# Patient Record
Sex: FEMALE | Race: WHITE | Employment: UNEMPLOYED | URBAN - METROPOLITAN AREA
[De-identification: names, ages, dates, MRNs, and addresses within clinical notes are randomized per-mention and may not be internally consistent; named-entity substitution may affect disease eponyms.]

---

## 2022-12-02 ENCOUNTER — OFFICE VISIT (OUTPATIENT)
Dept: PRIMARY CARE CLINIC | Age: 32
End: 2022-12-02
Payer: COMMERCIAL

## 2022-12-02 VITALS
OXYGEN SATURATION: 99 % | HEART RATE: 69 BPM | WEIGHT: 201.8 LBS | BODY MASS INDEX: 30.58 KG/M2 | DIASTOLIC BLOOD PRESSURE: 88 MMHG | HEIGHT: 68 IN | SYSTOLIC BLOOD PRESSURE: 120 MMHG | TEMPERATURE: 98.1 F

## 2022-12-02 DIAGNOSIS — Z00.00 WELL ADULT EXAM: Primary | ICD-10-CM

## 2022-12-02 DIAGNOSIS — R10.9 ACUTE FLANK PAIN: ICD-10-CM

## 2022-12-02 DIAGNOSIS — R53.83 OTHER FATIGUE: ICD-10-CM

## 2022-12-02 DIAGNOSIS — Z23 NEED FOR TDAP VACCINATION: ICD-10-CM

## 2022-12-02 DIAGNOSIS — F41.9 ANXIETY: ICD-10-CM

## 2022-12-02 DIAGNOSIS — R39.14 FEELING OF INCOMPLETE BLADDER EMPTYING: ICD-10-CM

## 2022-12-02 LAB
BILIRUBIN, POC: ABNORMAL
BLOOD URINE, POC: ABNORMAL
CLARITY, POC: ABNORMAL
COLOR, POC: YELLOW
GLUCOSE URINE, POC: ABNORMAL
KETONES, POC: ABNORMAL
LEUKOCYTE EST, POC: ABNORMAL
NITRITE, POC: ABNORMAL
PH, POC: 8.5
PROTEIN, POC: ABNORMAL
SPECIFIC GRAVITY, POC: 1.01
UROBILINOGEN, POC: ABNORMAL

## 2022-12-02 PROCEDURE — 90471 IMMUNIZATION ADMIN: CPT | Performed by: NURSE PRACTITIONER

## 2022-12-02 PROCEDURE — 99385 PREV VISIT NEW AGE 18-39: CPT | Performed by: NURSE PRACTITIONER

## 2022-12-02 PROCEDURE — 81002 URINALYSIS NONAUTO W/O SCOPE: CPT | Performed by: NURSE PRACTITIONER

## 2022-12-02 PROCEDURE — 90715 TDAP VACCINE 7 YRS/> IM: CPT | Performed by: NURSE PRACTITIONER

## 2022-12-02 RX ORDER — LEVONORGESTREL 52 MG/1
1 INTRAUTERINE DEVICE INTRAUTERINE ONCE
COMMUNITY

## 2022-12-02 SDOH — ECONOMIC STABILITY: FOOD INSECURITY: WITHIN THE PAST 12 MONTHS, THE FOOD YOU BOUGHT JUST DIDN'T LAST AND YOU DIDN'T HAVE MONEY TO GET MORE.: NEVER TRUE

## 2022-12-02 SDOH — ECONOMIC STABILITY: TRANSPORTATION INSECURITY
IN THE PAST 12 MONTHS, HAS LACK OF TRANSPORTATION KEPT YOU FROM MEETINGS, WORK, OR FROM GETTING THINGS NEEDED FOR DAILY LIVING?: NO

## 2022-12-02 SDOH — ECONOMIC STABILITY: FOOD INSECURITY: WITHIN THE PAST 12 MONTHS, YOU WORRIED THAT YOUR FOOD WOULD RUN OUT BEFORE YOU GOT MONEY TO BUY MORE.: NEVER TRUE

## 2022-12-02 SDOH — ECONOMIC STABILITY: INCOME INSECURITY: IN THE LAST 12 MONTHS, WAS THERE A TIME WHEN YOU WERE NOT ABLE TO PAY THE MORTGAGE OR RENT ON TIME?: NO

## 2022-12-02 SDOH — ECONOMIC STABILITY: TRANSPORTATION INSECURITY
IN THE PAST 12 MONTHS, HAS THE LACK OF TRANSPORTATION KEPT YOU FROM MEDICAL APPOINTMENTS OR FROM GETTING MEDICATIONS?: NO

## 2022-12-02 ASSESSMENT — ANXIETY QUESTIONNAIRES
5. BEING SO RESTLESS THAT IT IS HARD TO SIT STILL: 0
6. BECOMING EASILY ANNOYED OR IRRITABLE: 1
1. FEELING NERVOUS, ANXIOUS, OR ON EDGE: 2
7. FEELING AFRAID AS IF SOMETHING AWFUL MIGHT HAPPEN: 1
2. NOT BEING ABLE TO STOP OR CONTROL WORRYING: 2
4. TROUBLE RELAXING: 1
GAD7 TOTAL SCORE: 9
3. WORRYING TOO MUCH ABOUT DIFFERENT THINGS: 2

## 2022-12-02 ASSESSMENT — ENCOUNTER SYMPTOMS
SHORTNESS OF BREATH: 0
COUGH: 0
VOMITING: 0
DIARRHEA: 0
CONSTIPATION: 0
NAUSEA: 0

## 2022-12-02 ASSESSMENT — SOCIAL DETERMINANTS OF HEALTH (SDOH)
HOW OFTEN DO YOU ATTENT MEETINGS OF THE CLUB OR ORGANIZATION YOU BELONG TO?: NEVER
WITHIN THE LAST YEAR, HAVE TO BEEN RAPED OR FORCED TO HAVE ANY KIND OF SEXUAL ACTIVITY BY YOUR PARTNER OR EX-PARTNER?: NO
WITHIN THE LAST YEAR, HAVE YOU BEEN AFRAID OF YOUR PARTNER OR EX-PARTNER?: NO
HOW OFTEN DO YOU GET TOGETHER WITH FRIENDS OR RELATIVES?: ONCE A WEEK
IN A TYPICAL WEEK, HOW MANY TIMES DO YOU TALK ON THE PHONE WITH FAMILY, FRIENDS, OR NEIGHBORS?: MORE THAN THREE TIMES A WEEK
WITHIN THE LAST YEAR, HAVE YOU BEEN HUMILIATED OR EMOTIONALLY ABUSED IN OTHER WAYS BY YOUR PARTNER OR EX-PARTNER?: NO
HOW HARD IS IT FOR YOU TO PAY FOR THE VERY BASICS LIKE FOOD, HOUSING, MEDICAL CARE, AND HEATING?: NOT HARD AT ALL
WITHIN THE LAST YEAR, HAVE YOU BEEN KICKED, HIT, SLAPPED, OR OTHERWISE PHYSICALLY HURT BY YOUR PARTNER OR EX-PARTNER?: NO
HOW OFTEN DO YOU ATTEND CHURCH OR RELIGIOUS SERVICES?: NEVER
DO YOU BELONG TO ANY CLUBS OR ORGANIZATIONS SUCH AS CHURCH GROUPS UNIONS, FRATERNAL OR ATHLETIC GROUPS, OR SCHOOL GROUPS?: NO

## 2022-12-02 ASSESSMENT — PATIENT HEALTH QUESTIONNAIRE - PHQ9
1. LITTLE INTEREST OR PLEASURE IN DOING THINGS: 1
SUM OF ALL RESPONSES TO PHQ QUESTIONS 1-9: 1
2. FEELING DOWN, DEPRESSED OR HOPELESS: 0
SUM OF ALL RESPONSES TO PHQ9 QUESTIONS 1 & 2: 1

## 2022-12-02 NOTE — PROGRESS NOTES
[facility] [address]  [address]  YOB: 1990  Date of Service:  12/2/2022    Chief Complaint:   Bernadette Givens is a 28 y.o. female who presents to establish care and for complete physical examination. Current concerns     - need for tdap as brother has new baby    - UTI- concerns, not emptying bladder, took hubby's Augmentin months ago helped, but still having concerns. Some flank pain off and on. PHQ Scores 12/2/2022   PHQ2 Score 1   PHQ9 Score 1     Interpretation of Total Score Depression Severity: 1-4 = Minimal depression, 5-9 = Mild depression, 10-14 = Moderate depression, 15-19 = Moderately severe depression, 20-27 = Severe depression  LIMA 7 SCORE 12/2/2022   LIMA-7 Total Score 9     Interpretation of LIMA-7 score: 5-9 = mild anxiety, 10-14 = moderate anxiety, 15+ = severe anxiety. Recommend referral to behavioral health for scores 10 or greater. HPI:   Julien Lowe reports she is healthy, former provider with [facility] will obtain medical records, reports a h/o IBS a teen was taking medication at one point but reports no current issues rosacea is followed by derm. Pt is  with two children, 15 yo son ,daughter age 5. Past Medical History:   Diagnosis Date    H/O irritable bowel syndrome     years ago not really a issue now    Rosacea, unspecified     is followed by derm     Allergies   Allergen Reactions    Sulfa Antibiotics Other (See Comments)     Other reaction(s): Other (See Comments)  unknown       There is no problem list on file for this patient.     Outpatient Medications Marked as Taking for the 12/2/22 encounter (Office Visit) with DHRUV Rodriguez - CNP   Medication Sig Dispense Refill    levonorgestrel (LILETTA, 52 MG,) 20.1 MCG/DAY IUD IUD 52 mg 1 each by IntraUTERine route once Oct 2020       Past Surgical History:   Procedure Laterality Date    WISDOM TOOTH EXTRACTION      age 15 Preventive Care:    Health Maintenance   Topic Date Due    COVID-19 Vaccine (1) Never done    Varicella vaccine (1 of 2 - 2-dose childhood series) Never done    Depression Screen  Never done    HIV screen  Never done    Hepatitis C screen  Never done    Cervical cancer screen  Never done    Flu vaccine (1) Never done    DTaP/Tdap/Td vaccine (2 - Td or Tdap) 12/02/2032    Hepatitis A vaccine  Aged Out    Hib vaccine  Aged Out    Meningococcal (ACWY) vaccine  Aged Out    Pneumococcal 0-64 years Vaccine  Aged Out        Hx abnormal PAP: yes - 12 years ago, LEEP,  no issues since, is followed by GYN last pap- thinks Oct 22 will obtain results  Self-breast exams: no  Last eye exam: 2019 years ago should be wearing glasses, abnormal   Dental exam: over 5 years   Exercise: no regular exercise  Diet- none     Lipid panel: No results found for: CHOL, TRIG, HDL, LDLCALC, LDLDIRECT   The ASCVD Risk score (Aviva HENRIQUEZ, et al., 2019) failed to calculate for the following reasons:     The 2019 ASCVD risk score is only valid for ages 36 to 78    Advance Directive: N, <no information>    Health Maintenance   Topic Date Due    COVID-19 Vaccine (1) Never done    Varicella vaccine (1 of 2 - 2-dose childhood series) Never done    Depression Screen  Never done    HIV screen  Never done    Hepatitis C screen  Never done    Cervical cancer screen  Never done    Flu vaccine (1) Never done    DTaP/Tdap/Td vaccine (2 - Td or Tdap) 12/02/2032    Hepatitis A vaccine  Aged Out    Hib vaccine  Aged Out    Meningococcal (ACWY) vaccine  Aged Out    Pneumococcal 0-64 years Vaccine  Aged [employer] History   Administered Date(s) Administered    Tdap (Boostrix, Adacel) 12/02/2022     Family History   Problem Relation Age of Onset    Breast Problems Mother         breast mass    No Known Problems Father     Other Maternal Grandmother 80        passed in sleep possible MI    Alcohol Abuse Maternal Grandfather     Dementia Paternal Grandmother     Other Paternal Grandfather         unsure     Social History     Socioeconomic History    Marital status: Single     Spouse name: Abimael Salgado    Number of children: 2    Years of education: 12    Highest education level: High school graduate   Occupational History    Not on file   Tobacco Use    Smoking status: Former     Packs/day: 1.00     Types: Cigarettes     Start date:      Quit date: 2016     Years since quittin.9    Smokeless tobacco: Never   Vaping Use    Vaping Use: Every day    Substances: Nicotine    Devices: Disposable   Substance and Sexual Activity    Alcohol use: Yes     Comment: social    Drug use: Not Currently     Types: Marijuana Ophelia Nicola)    Sexual activity: Yes     Partners: Male   Other Topics Concern    Not on file   Social History Narrative    Not on file     Social Determinants of Health     Financial Resource Strain: Low Risk     Difficulty of Paying Living Expenses: Not hard at all   Food Insecurity: No Food Insecurity    Worried About Running Out of Food in the Last Year: Never true    Ran Out of Food in the Last Year: Never true   Transportation Needs: No Transportation Needs    Lack of Transportation (Medical): No    Lack of Transportation (Non-Medical): No   Physical Activity: Not on file   Stress: Not on file   Social Connections:  Moderately Isolated    Frequency of Communication with Friends and Family: More than three times a week    Frequency of Social Gatherings with Friends and Family: Once a week    Attends Muslim Services: Never    Active Member of Clubs or Organizations: No    Attends Club or Organization Meetings: Never    Marital Status:    Intimate Partner Violence: Not At Risk    Fear of Current or Ex-Partner: No    Emotionally Abused: No    Physically Abused: No    Sexually Abused: No   Housing Stability: Unknown    Unable to Pay for Housing in the Last Year: No    Number of [place] in the Last Year: Not on file    Unstable Housing in the Last Year: Not on file       HISTORY:  Patient's medications, allergies, past medical, and social histories were reviewed and updated as appropriate. LAST LABS    No results found for: CHOL, LDL, LDLCALCNo results found for: HDLNo results found for: TRIG  No results found for: GLUCOSE  No results found for: NA, K, CREATININE, GLU  No results found for: WBC, HGB, HCT, MCV, PLT  No results found for: ALT, AST, GGT, ALKPHOS, BILITOT  No results found for: TSH  No results found for: LABA1C    Review of Systems:  Review of Systems   Constitutional:  Positive for fatigue. Negative for activity change, appetite change, chills and fever. Respiratory:  Negative for cough and shortness of breath. Cardiovascular:  Negative for chest pain, palpitations and leg swelling. Gastrointestinal:  Negative for constipation, diarrhea, nausea and vomiting. Genitourinary:  Positive for flank pain (off and on). Negative for dysuria. Feelings of incomplete voiding   Skin:  Negative for rash. Psychiatric/Behavioral:  The patient is nervous/anxious. Objective:     PHYSICAL EXAM     /88   Pulse 69   Temp 98.1 °F (36.7 °C)   Ht 5' 7.52\" (1.715 m)   Wt 201 lb 12.8 oz (91.5 kg)   SpO2 99%   BMI 31.12 kg/m²   BP Readings from Last 5 Encounters:   12/02/22 120/88     Wt Readings from Last 5 Encounters:   12/02/22 201 lb 12.8 oz (91.5 kg)    Body mass index is 31.12 kg/m². Physical Exam  Constitutional:       Appearance: Normal appearance. Neck:      Vascular: No carotid bruit. Cardiovascular:      Rate and Rhythm: Normal rate and regular rhythm. Pulses: Normal pulses. Heart sounds: Normal heart sounds. Pulmonary:      Effort: Pulmonary effort is normal.      Breath sounds: Normal breath sounds. Abdominal:      General: Abdomen is flat. Bowel sounds are normal.      Palpations: Abdomen is soft. Tenderness: There is no abdominal tenderness.  There is right CVA tenderness and left CVA tenderness. Musculoskeletal:      Cervical back: Normal range of motion and neck supple. Lymphadenopathy:      Cervical: No cervical adenopathy. Skin:     General: Skin is warm. Capillary Refill: Capillary refill takes less than 2 seconds. Findings: Lesion (noting lesion to upper abdomen, erythematous irregular borders size of a quarter patient reports changing over the last year) present. Neurological:      General: No focal deficit present. Mental Status: She is alert. Psychiatric:         Attention and Perception: Attention and perception normal.         Mood and Affect: Mood is anxious. Affect is tearful (when discussing outlets/friends pt became tearful). Behavior: Behavior normal.         Thought Content: Thought content normal.         Cognition and Memory: Cognition normal.         Judgment: Judgment normal.        Assessment/Plan    Jada was seen today for annual exam and establish care. - pt to work on regular diet  - pt to r/t for needed labs  - pt tearful on exam, with fatigue, will assess labs on Monday she is not fasting today, consider psych referral, pt denies depression, SI/HI  - pt to f/u with Derm provider for lesion to upper abdomen x years changing over time     Diagnoses and all orders for this visit:    Well adult exam  -     Comprehensive Metabolic Panel; Future  -     CBC with Auto Differential; Future  -     Hemoglobin A1C; Future  -     Lipid, Fasting; Future  -     TSH with Reflex; Future  -     Vitamin D 25 Hydroxy; Future    Anxiety  -     TSH with Reflex; Future    Other fatigue  -     TSH with Reflex; Future  -     Vitamin D 25 Hydroxy;  Future    Feeling of incomplete bladder emptying  -     POCT Urinalysis no Micro  -     Culture, Urine    Acute flank pain  -     POCT Urinalysis no Micro- negative, will send for culture, consider referral to Urology and or bladder scan etc.  -     Culture, Urine- will call with results    Need for Tdap vaccination  -     Tdap, BOOSTRIX, (age 8 yrs+), IM      Patient given educational handouts and has had all questions answered. Patient voices understanding and agrees to plans along with risks and benefits of plan. Patient is instructed to continue prior meds, diet, and exercise plans as instructed. Patient agrees to follow up as instructed and sooner if needed. Patient agrees to go to ER if condition becomes emergent. This dictation was performed with a verbal recognition program (DRAGON) and it was checked for errors. It is possible that there are still dictated errors within this office note. If so, please bring any errors to my attention for an addendum. All efforts were made to ensure that this office note is accurate. Patient Education:    Counseled on importance of healthy diet and regular exercise of at least 30 minutes on four or more days during the week. Counseled on skin safety, SPF 27 or higher prior to going outdoors and reapplication every twohours while outside.  Monitor moles for changes, report to provider if greater than 6 mm, color variations, asymmetry, redness, scales, and/or overlying skin changes  Counseled on safety, wear seatbelt, do not consumealcohol and drive or drive with anyone who has consumed alcohol  Counseled on safe sex practices, wear condoms, limit number of sexual partners  Counseled on importance of monthly self breast exams, perform on same time each month, monitor for newlumps/bumps/masses, tenderness, changes to size or contour, dimpling, nipple discharge, new nipple retraction, and overlying skin changes, report to provider

## 2022-12-04 LAB — URINE CULTURE, ROUTINE: NORMAL

## 2022-12-05 ENCOUNTER — NURSE ONLY (OUTPATIENT)
Dept: PRIMARY CARE CLINIC | Age: 32
End: 2022-12-05
Payer: COMMERCIAL

## 2022-12-05 DIAGNOSIS — R53.83 OTHER FATIGUE: ICD-10-CM

## 2022-12-05 DIAGNOSIS — F41.9 ANXIETY: ICD-10-CM

## 2022-12-05 DIAGNOSIS — Z00.00 WELL ADULT EXAM: ICD-10-CM

## 2022-12-05 PROCEDURE — 36415 COLL VENOUS BLD VENIPUNCTURE: CPT | Performed by: NURSE PRACTITIONER

## 2022-12-06 LAB
A/G RATIO: 1.7 (ref 1.1–2.2)
ALBUMIN SERPL-MCNC: 4.5 G/DL (ref 3.4–5)
ALP BLD-CCNC: 86 U/L (ref 40–129)
ALT SERPL-CCNC: 40 U/L (ref 10–40)
ANION GAP SERPL CALCULATED.3IONS-SCNC: 15 MMOL/L (ref 3–16)
AST SERPL-CCNC: 25 U/L (ref 15–37)
BASOPHILS ABSOLUTE: 0.1 K/UL (ref 0–0.2)
BASOPHILS RELATIVE PERCENT: 1.1 %
BILIRUB SERPL-MCNC: 0.7 MG/DL (ref 0–1)
BUN BLDV-MCNC: 9 MG/DL (ref 7–20)
CALCIUM SERPL-MCNC: 10.1 MG/DL (ref 8.3–10.6)
CHLORIDE BLD-SCNC: 103 MMOL/L (ref 99–110)
CHOLESTEROL, FASTING: 171 MG/DL (ref 0–199)
CO2: 23 MMOL/L (ref 21–32)
CREAT SERPL-MCNC: 0.7 MG/DL (ref 0.6–1.1)
EOSINOPHILS ABSOLUTE: 0.1 K/UL (ref 0–0.6)
EOSINOPHILS RELATIVE PERCENT: 1.7 %
ESTIMATED AVERAGE GLUCOSE: 99.7 MG/DL
GFR SERPL CREATININE-BSD FRML MDRD: >60 ML/MIN/{1.73_M2}
GLUCOSE BLD-MCNC: 105 MG/DL (ref 70–99)
HBA1C MFR BLD: 5.1 %
HCT VFR BLD CALC: 43.7 % (ref 36–48)
HDLC SERPL-MCNC: 52 MG/DL (ref 40–60)
HEMOGLOBIN: 14.6 G/DL (ref 12–16)
LDL CHOLESTEROL CALCULATED: 105 MG/DL
LYMPHOCYTES ABSOLUTE: 1.6 K/UL (ref 1–5.1)
LYMPHOCYTES RELATIVE PERCENT: 30.9 %
MCH RBC QN AUTO: 32.6 PG (ref 26–34)
MCHC RBC AUTO-ENTMCNC: 33.4 G/DL (ref 31–36)
MCV RBC AUTO: 97.4 FL (ref 80–100)
MONOCYTES ABSOLUTE: 0.3 K/UL (ref 0–1.3)
MONOCYTES RELATIVE PERCENT: 6.1 %
NEUTROPHILS ABSOLUTE: 3.1 K/UL (ref 1.7–7.7)
NEUTROPHILS RELATIVE PERCENT: 60.2 %
PDW BLD-RTO: 13.2 % (ref 12.4–15.4)
PLATELET # BLD: 289 K/UL (ref 135–450)
PMV BLD AUTO: 10.2 FL (ref 5–10.5)
POTASSIUM SERPL-SCNC: 4.9 MMOL/L (ref 3.5–5.1)
RBC # BLD: 4.49 M/UL (ref 4–5.2)
SODIUM BLD-SCNC: 141 MMOL/L (ref 136–145)
TOTAL PROTEIN: 7.2 G/DL (ref 6.4–8.2)
TRIGLYCERIDE, FASTING: 70 MG/DL (ref 0–150)
TSH REFLEX: 1.73 UIU/ML (ref 0.27–4.2)
VITAMIN D 25-HYDROXY: 30.2 NG/ML
VLDLC SERPL CALC-MCNC: 14 MG/DL
WBC # BLD: 5.2 K/UL (ref 4–11)

## 2023-01-12 ENCOUNTER — NURSE ONLY (OUTPATIENT)
Dept: PRIMARY CARE CLINIC | Age: 33
End: 2023-01-12

## 2023-01-12 DIAGNOSIS — Z01.84 IMMUNITY STATUS TESTING: Primary | ICD-10-CM

## 2023-01-12 NOTE — PROGRESS NOTES
Pt is unable to prove MMR vaccination,    1. Immunity status testing  - Mumps, Measles, Rubella, and Varicella Zoster, IgG;  Future   Lab draw right AC one attempt no issues

## 2023-01-13 LAB
MEASLES IMMUNE (IGG): NORMAL
MUMPS AB IGG: NORMAL
RUBELLA ANTIBODY IGG: NORMAL
VARICELLA-ZOSTER VIRUS AB, IGG: NORMAL

## 2023-01-17 ENCOUNTER — NURSE ONLY (OUTPATIENT)
Dept: PRIMARY CARE CLINIC | Age: 33
End: 2023-01-17
Payer: COMMERCIAL

## 2023-01-17 DIAGNOSIS — Z23 NEED FOR MMR VACCINE: Primary | ICD-10-CM

## 2023-01-17 PROCEDURE — 90471 IMMUNIZATION ADMIN: CPT | Performed by: NURSE PRACTITIONER

## 2023-01-17 PROCEDURE — 90707 MMR VACCINE SC: CPT | Performed by: NURSE PRACTITIONER

## 2023-01-17 NOTE — PROGRESS NOTES
1. Need for MMR vaccine  - MMR, M-M-R II, (age 15 mo+), [place]  Needs for work, form filled out for her, however documented provided stating pt will need 2nd immunization in 4 weeks to be deemed immunized completely.       Pt to RTC in 4 weeks

## 2023-02-14 ENCOUNTER — NURSE ONLY (OUTPATIENT)
Dept: PRIMARY CARE CLINIC | Age: 33
End: 2023-02-14
Payer: COMMERCIAL

## 2023-02-14 DIAGNOSIS — Z23 NEED FOR MMR VACCINE: Primary | ICD-10-CM

## 2023-02-14 PROCEDURE — 90471 IMMUNIZATION ADMIN: CPT | Performed by: NURSE PRACTITIONER

## 2023-02-14 PROCEDURE — 90707 MMR VACCINE SC: CPT | Performed by: NURSE PRACTITIONER

## 2023-02-14 NOTE — PROGRESS NOTES
Maria C Castellanos is her for her 2nd MMR, and needed paperwork completed     Diagnosis Orders   1.  Need for MMR vaccine  MMR, M-M-R II, (age 15 mo+), [place]